# Patient Record
(demographics unavailable — no encounter records)

---

## 2024-12-06 NOTE — HISTORY OF PRESENT ILLNESS
[de-identified] : 3 yo F with hx of diarrhea. They went to [place] last week for Thanksgiving and another family member had diarrhea.  The 2-year-old cousin had diarrhea.  She developed diarrhea last Tuesday..  She used to have a pediatrician but has not seen one in a while. She has been home and has not been to . She has not gotten sick so often.   Mom just came back from surgery (ectopic pregnancy).    Her stool has been looser, stooling 2-3 per day, has continued at 2-3x per day. Trion 7, There is no blood or mucous in the stool. Smells bad. Cousin still has diarrhea also..  No vomiting  She is eating ok, but giving constipating foods. Bread and pasta. DC dairy, eats rice and meat. Occ stooling overnight but it may be when she wakes in the AM. When she goes to stool she  has some discomfort.  Stools are Trion 7.  When she is not stooling she is not having pain.  There have been no fevers.  Stools typically are formed.  She has not gotten vaccines.

## 2024-12-06 NOTE — PHYSICAL EXAM
[Well Developed] : well developed [Well Nourished] : well nourished [NAD] : in no acute distress [PERRL] : pupils were equal, round, reactive to light  [icteric] : anicteric [Moist & Pink Mucous Membranes] : moist and pink mucous membranes [Normal Oropharynx] : the oropharynx was normal [CTAB] : lungs clear to auscultation bilaterally [Respiratory Distress] : no respiratory distress  [Wheeze] : no wheezing  [Regular Rate and Rhythm] : regular rate and rhythm [Normal S1, S2] : normal S1 and S2 [Murmur] : no murmur [Soft] : soft  [Distended] : non distended [Tender] : non tender [Normal Bowel Sounds] : normal bowel sounds [Stool Palpable] : no stool palpable [Mass ___ cm] : no masses were palpated [No HSM] : no hepatosplenomegaly appreciated [Rectal Exam Deferred] : rectal exam was deferred [Lymphadenopathy] : no lymphadenopathy  [Normal Tone] : normal tone [Well-Perfused] : well-perfused [Edema] : no edema [Cyanosis] : no cyanosis [Rash] : no rash [Jaundice] : no jaundice [Interactive] : interactive [Appropriate Behavior] : appropriate behavior [FreeTextEntry1] : Resistant to the exam

## 2024-12-06 NOTE — ASSESSMENT
[FreeTextEntry1] : 3-year-old female with history of acute diarrhea that began about 9 days ago on a family trip where she was exposed to another ill family member.  She is on a dairy free diet and stooling 2-3 times a day with [place] 7 stools and no blood.  She is eating and drinking well and appeared well-hydrated on exam.  Had long discussion about infectious diarrhea.  Recommend: -Discussed the importance of hydration -Would continue avoiding dairy products until she is better for 2 weeks then reintroduce them -Start a probiotic -If diarrhea continues another week would send her GI PCR off -Gave family the phone number for [address] [place] [address] to establish with a pediatrician - Family instructed to call for lab results if she is not improving and they do the GI PCR and follow-up will be arranged as needed

## 2024-12-06 NOTE — HISTORY OF PRESENT ILLNESS
[de-identified] : 3 yo F with hx of diarrhea. They went to [place] last week for Thanksgiving and another family member had diarrhea.  The 2-year-old cousin had diarrhea.  She developed diarrhea last Tuesday..  She used to have a pediatrician but has not seen one in a while. She has been home and has not been to . She has not gotten sick so often.   Mom just came back from surgery (ectopic pregnancy).    Her stool has been looser, stooling 2-3 per day, has continued at 2-3x per day. [place] 7, There is no blood or mucous in the stool. Smells bad. Cousin still has diarrhea also..  No vomiting  She is eating ok, but giving constipating foods. Bread and pasta. DC dairy, eats rice and meat. Occ stooling overnight but it may be when she wakes in the AM. When she goes to stool she  has some discomfort.  Stools are [place]unker 7.  When she is not stooling she is not having pain.  There have been no fevers.  Stools typically are formed.  She has not gotten vaccines.

## 2024-12-06 NOTE — CONSULT LETTER
[Dear  ___] : Dear  [unfilled], [Consult Letter:] : I had the pleasure of evaluating your patient, [unfilled]. [Please see my note below.] : Please see my note below. [Consult Closing:] : Thank you very much for allowing me to participate in the care of this patient.  If you have any questions, please do not hesitate to contact me. [Sincerely,] : Sincerely, [FreeTextEntry3] : Gaby Hinton MD Attending Physician Pediatric Gastroenterology and Nutrition

## 2024-12-06 NOTE — ASSESSMENT
[FreeTextEntry1] : 3-year-old female with history of acute diarrhea that began about 9 days ago on a family trip where she was exposed to another ill family member.  She is on a dairy free diet and stooling 2-3 times a day with [place] 7 stools and no blood.  She is eating and drinking well and appeared well-hydrated on exam.  Had long discussion about infectious diarrhea.  Recommend: -Discussed the importance of hydration -Would continue avoiding dairy products until she is better for 2 weeks then reintroduce them -Start a probiotic -If diarrhea continues another week would send her GI PCR off -Gave family the phone number for [address] [place] [address] to establish with a pediatrician - Family instructed to call for lab results if she is not improving and they do the GI PCR and follow-up will be arranged as needed